# Patient Record
Sex: FEMALE | Race: WHITE | NOT HISPANIC OR LATINO | Employment: FULL TIME | URBAN - METROPOLITAN AREA
[De-identification: names, ages, dates, MRNs, and addresses within clinical notes are randomized per-mention and may not be internally consistent; named-entity substitution may affect disease eponyms.]

---

## 2024-08-01 ENCOUNTER — APPOINTMENT (OUTPATIENT)
Dept: RADIOLOGY | Facility: CLINIC | Age: 34
End: 2024-08-01
Attending: EMERGENCY MEDICINE
Payer: COMMERCIAL

## 2024-08-01 ENCOUNTER — HOSPITAL ENCOUNTER (EMERGENCY)
Facility: CLINIC | Age: 34
End: 2024-08-01

## 2024-08-01 ENCOUNTER — HOSPITAL ENCOUNTER (EMERGENCY)
Facility: CLINIC | Age: 34
Discharge: HOME | End: 2024-08-01
Attending: EMERGENCY MEDICINE | Admitting: EMERGENCY MEDICINE
Payer: COMMERCIAL

## 2024-08-01 VITALS
HEIGHT: 67 IN | SYSTOLIC BLOOD PRESSURE: 128 MMHG | HEART RATE: 107 BPM | DIASTOLIC BLOOD PRESSURE: 72 MMHG | OXYGEN SATURATION: 99 % | WEIGHT: 195 LBS | BODY MASS INDEX: 30.61 KG/M2 | TEMPERATURE: 97.8 F | RESPIRATION RATE: 18 BRPM

## 2024-08-01 DIAGNOSIS — M54.42 ACUTE MIDLINE LOW BACK PAIN WITH LEFT-SIDED SCIATICA: ICD-10-CM

## 2024-08-01 PROCEDURE — 96372 THER/PROPH/DIAG INJ SC/IM: CPT | Performed by: EMERGENCY MEDICINE

## 2024-08-01 PROCEDURE — 250N000011 HC RX IP 250 OP 636: Performed by: EMERGENCY MEDICINE

## 2024-08-01 PROCEDURE — 250N000013 HC RX MED GY IP 250 OP 250 PS 637: Performed by: EMERGENCY MEDICINE

## 2024-08-01 PROCEDURE — 72100 X-RAY EXAM L-S SPINE 2/3 VWS: CPT

## 2024-08-01 PROCEDURE — 99284 EMERGENCY DEPT VISIT MOD MDM: CPT | Mod: 25

## 2024-08-01 RX ORDER — KETOROLAC TROMETHAMINE 30 MG/ML
30 INJECTION, SOLUTION INTRAMUSCULAR; INTRAVENOUS ONCE
Status: COMPLETED | OUTPATIENT
Start: 2024-08-01 | End: 2024-08-01

## 2024-08-01 RX ORDER — METHOCARBAMOL 500 MG/1
500 TABLET, FILM COATED ORAL 3 TIMES DAILY PRN
Qty: 20 TABLET | Refills: 0 | Status: SHIPPED | OUTPATIENT
Start: 2024-08-01

## 2024-08-01 RX ORDER — METHOCARBAMOL 500 MG/1
500 TABLET, FILM COATED ORAL ONCE
Status: COMPLETED | OUTPATIENT
Start: 2024-08-01 | End: 2024-08-01

## 2024-08-01 RX ORDER — METHYLPREDNISOLONE 4 MG
TABLET, DOSE PACK ORAL
Qty: 21 TABLET | Refills: 0 | Status: SHIPPED | OUTPATIENT
Start: 2024-08-01

## 2024-08-01 RX ORDER — ACETAMINOPHEN 325 MG/1
650 TABLET ORAL ONCE
Status: COMPLETED | OUTPATIENT
Start: 2024-08-01 | End: 2024-08-01

## 2024-08-01 RX ORDER — LIDOCAINE 4 G/G
1 PATCH TOPICAL ONCE
Status: DISCONTINUED | OUTPATIENT
Start: 2024-08-01 | End: 2024-08-01 | Stop reason: HOSPADM

## 2024-08-01 RX ADMIN — LIDOCAINE 1 PATCH: 4 PATCH TOPICAL at 16:04

## 2024-08-01 RX ADMIN — KETOROLAC TROMETHAMINE 30 MG: 30 INJECTION, SOLUTION INTRAMUSCULAR at 16:05

## 2024-08-01 RX ADMIN — METHOCARBAMOL 500 MG: 500 TABLET ORAL at 16:02

## 2024-08-01 RX ADMIN — ACETAMINOPHEN 650 MG: 325 TABLET ORAL at 16:02

## 2024-08-01 ASSESSMENT — COLUMBIA-SUICIDE SEVERITY RATING SCALE - C-SSRS
2. HAVE YOU ACTUALLY HAD ANY THOUGHTS OF KILLING YOURSELF IN THE PAST MONTH?: NO
1. IN THE PAST MONTH, HAVE YOU WISHED YOU WERE DEAD OR WISHED YOU COULD GO TO SLEEP AND NOT WAKE UP?: NO
6. HAVE YOU EVER DONE ANYTHING, STARTED TO DO ANYTHING, OR PREPARED TO DO ANYTHING TO END YOUR LIFE?: NO

## 2024-08-01 ASSESSMENT — ACTIVITIES OF DAILY LIVING (ADL)
ADLS_ACUITY_SCORE: 33
ADLS_ACUITY_SCORE: 33

## 2024-08-01 NOTE — Clinical Note
Amanda Simmons was seen and treated in our emergency department on 8/1/2024.  She may return to work on 08/05/2024.       If you have any questions or concerns, please don't hesitate to call.      Kacey Boyce, DO

## 2024-08-01 NOTE — DISCHARGE INSTRUCTIONS
The x-rays of your low back appear reassuring.  Although the exact cause of your back pain remains unclear, a herniated disc seem most likely.  The other possible cause would be muscle spasms.      Continue to use over-the-counter naproxen twice a day as needed.  You can also use your home lidocaine patches as directed to help with the pain.  Take the prescribed muscle relaxant as needed.  Use the Medrol Dosepak as directed to which may help decrease the swelling and pain associated with a herniated disc.    A referral will be placed for you to follow-up with the spine clinic.  You would like to be contacted for a follow-up appointment within the next week.  Return back to ED sooner for any worsening back pain, numbness/tingling/weakness in your legs, loss of bowel or bladder control, or any other new or concerning symptoms.

## 2024-08-01 NOTE — ED PROVIDER NOTES
EMERGENCY DEPARTMENT ENCOUNTER      NAME: Amanda Simmons  AGE: 34 year old female  YOB: 1990  MRN: [id number]  EVALUATION DATE & TIME: No admission date for patient encounter.    PCP: [facility]    ED PROVIDER: Kacey Boyce DO      Chief Complaint   Patient presents with    Back Injury    Fall         FINAL IMPRESSION:  1. Acute midline low back pain with left-sided sciatica          ED COURSE & MEDICAL DECISION MAKIN-year-old female presented to the ED for evaluation of low back pain that occurred after she squatted down to pick something up.  Patient also reported paresthesias traveling down her right leg.  The patient denied any other associated symptoms or trauma or injury.  Here in the ED the patient was slightly tachycardic upon arrival.  She was uncomfortable appearing at the time of her initial evaluation.  The exam was somewhat limited since the patient could not sit or lay down secondary to her pain.  Patient preferred to  a hunched over position.  On exam she was noted tenderness palpation noted over the lumbar spine and paraspinal musculature.      Following initial evaluation the patient was given IM Toradol, Tylenol, Robaxin, and a lidocaine patch.    X-rays of the lumbar spine did not show evidence of acute fracture or dislocation.    The patient was reevaluated and informed of the reassuring x-ray results.  Patient states that her pain improved from a 10 down to a 5 or 6.  Patient was also able to sit in a chair which she was unable to do earlier.  The patient also stated that the numbness and tingling had resolved as well.  The patient was reexamined.  The patient did not have any neurologic deficits noted in her lower extremities at the time reevaluation although her bilateral straight leg raising test was a equivocal.     The patient was informed that her symptoms likely represent a lumbar disc herniation versus muscle spasm.  Because the  patient does not have any objective neurologic deficits here in the ED additional imaging with an MRI was not felt to be necessary at this time.  After educating and reassure the patient she felt comfortable returning home.  The patient was sent home with a Medrol Dosepak and a muscle relaxant to use as needed.  The patient will continue taking her home naproxen and lidocaine patches as needed.  A referral was placed for the patient to follow-up with the spine clinic.  The patient was instructed to return back to ED sooner for any worsening pain, numbness/tingling/weakness in the lower extremities, loss of bowel or bladder control, or any other new or concerning symptoms.    Pertinent Labs & Imaging studies reviewed. (See chart for details)  3:04 PM I met with the patient to gather history and to perform my initial exam. We discussed plans for the ED course, including diagnostic testing and treatment.  6:01 PM Rechecked and updated patient.      PM Reevaluated and updated the patient with results. We discussed the plan for discharge and the patient is agreeable. Reviewed supportive cares, symptomatic treatment, outpatient follow up, and reasons to return to the Emergency Department. Patient to be discharged by ED RN.      34 year old female presents to the Emergency Department for evaluation of back injury.         At the conclusion of the encounter I discussed the results of all of the tests and the disposition. The questions were answered. The patient or family acknowledged understanding and was agreeable with the care plan.     Medical Decision Making    History:  Supplemental history from: Documented in chart  External Record(s) reviewed: Documented in chart    Work Up:  Chart documentation includes differential considered and any EKGs or imaging independently interpreted by provider, where specified.  In additional to work up documented, I considered the following work up: Documented in chart, if  "applicable.    External consultation:  Discussion of management with another provider: Documented in chart, if applicable    Complicating factors:  Care impacted by chronic illness: N/A  Care affected by social determinants of health: Access to Medical Care    Disposition considerations: Discharge. I prescribed additional prescription strength medication(s) as charted. See documentation for any additional details.      PPE worn: n95 mask, goggles    MEDICATIONS GIVEN IN THE EMERGENCY:  Medications   Lidocaine (LIDOCARE) 4 % Patch 1 patch (1 patch Transdermal $Patch/Med Applied 8/1/24 1604)   ketorolac (TORADOL) injection 30 mg (30 mg Intramuscular $Given 8/1/24 1605)   acetaminophen (TYLENOL) tablet 650 mg (650 mg Oral $Given 8/1/24 1602)   methocarbamol (ROBAXIN) tablet 500 mg (500 mg Oral $Given 8/1/24 1602)       NEW PRESCRIPTIONS STARTED AT TODAY'S ER VISIT  New Prescriptions    METHOCARBAMOL (ROBAXIN) 500 MG TABLET    Take 1 tablet (500 mg) by mouth 3 times daily as needed for muscle spasms    METHYLPREDNISOLONE (MEDROL DOSEPAK) 4 MG TABLET THERAPY PACK    Follow Package Directions          =================================================================    HPI    Patient information was obtained from: patient    Use of : N/A         Amadna Simmons is a 34 year old female with no pertinent history who presents to this ED private car for evaluation of back pain.    Patient is presenting to the ED for a back pain. She was doing a squat to grab something while cooking and felt something \"pop\" in her low back. She was unable to get out of the squatting position. Pain is in her low back and radiates into right leg all the way down to her toes while walking. She describes paresthesias in her right leg. Whiles standing pain is isolated just in her low back. She states sitting down is painful.     Patient denies taking medications for pain, other injuries, and history of back pain. There were no " "other complaints/concerns at this time.      REVIEW OF SYSTEMS   Review of Systems     PAST MEDICAL HISTORY:  History reviewed. No pertinent past medical history.    PAST SURGICAL HISTORY:  History reviewed. No pertinent surgical history.        CURRENT MEDICATIONS:    methocarbamol (ROBAXIN) 500 MG tablet  methylPREDNISolone (MEDROL DOSEPAK) 4 MG tablet therapy pack        ALLERGIES:  Allergies   Allergen Reactions    Other (Do Not Use) Other (See Comments)     Cat Epithelial= cause's throat tightness    Penicillins Hives     sob    Shellfish-Derived Products Hives       FAMILY HISTORY:  History reviewed. No pertinent family history.    SOCIAL HISTORY:   Social History     Socioeconomic History    Marital status:      Spouse name: None    Number of children: None    Years of education: None    Highest education level: None       VITALS:  /72   Pulse 107   Temp 97.8  F (36.6  C) (Temporal)   Resp 18   Ht 1.71 m (5' 7.32\")   Wt 88.5 kg (195 lb)   SpO2 99%   BMI 30.25 kg/m      PHYSICAL EXAM    General presentation: Alert, Vital signs reviewed.  Uncomfortable appearing.  Standing in a hunched position.   HENT: ENT inspection is normal. Oropharynx is moist and clear.   Eye: Pupils are equal and reactive to light. EOMI  Neck: The neck is supple.  Pulmonary: Currently in no acute respiratory distress.   Circulatory: Peripheral pulses are strong and equal in the bilateral upper lower extremities.   Neurologic: Alert, oriented to person, place, and time.  Strength is 5/5 in the bilateral lower extremities.  No sensory deficits noted to light touch in the bilateral lower extremities.  Patellar DTR are 2/4 bilaterally.  Bilateral straight leg raising test is equivocal.  Cranial nerves II through XII are grossly intact.  Musculoskeletal: Mild tenderness palpation noted over the lumbar spine.  No extremity tenderness. Full range of motion in all extremities. No extremity edema.   Skin: Skin color is normal. " No rash. Warm. Dry to touch.       LAB:  All pertinent labs reviewed and interpreted.  Results for orders placed or performed during the hospital encounter of 08/01/24   Lumbar spine XR, 2-3 views    Impression    IMPRESSION: Normal vertebral body heights. Negative for fracture. Mild upper lumbar dextroconvex curvature centered at L2. Normal disc spaces. No sagittal subluxation.       RADIOLOGY:  Reviewed all pertinent imaging. Please see official radiology report.  Lumbar spine XR, 2-3 views   Final Result   IMPRESSION: Normal vertebral body heights. Negative for fracture. Mild upper lumbar dextroconvex curvature centered at L2. Normal disc spaces. No sagittal subluxation.             I, Fabio Ma, am serving as a scribe to document services personally performed by Kacey Boyce DO based on my observation and the provider's statements to me. I, Kacey Boyce, attest that Fabio Ma is acting in a scribe capacity, has observed my performance of the services and has documented them in accordance with my direction.    Kacey Boyce DO  Emergency Medicine  [facility] EMERGENCY ROOM  [address] [id number] [phone]       Kacey Boyce DO  08/01/24 1842       Kacey Boyce DO  08/01/24 1844

## 2024-08-01 NOTE — ED TRIAGE NOTES
Pt presents to the ED after falling in the kitchen an hour ago injuring lower back. Endorses pain radiating and causing tingling in bilateral legs but worse in right. Denies numbness, loss of bladder bowel. Endorses history of spine issues and had cervical injections 2 weeks ago     Triage Assessment (Adult)       Row Name 08/01/24 1329          Triage Assessment    Airway WDL WDL        Respiratory WDL    Respiratory WDL WDL        Skin Circulation/Temperature WDL    Skin Circulation/Temperature WDL WDL        Cardiac WDL    Cardiac WDL WDL        Peripheral/Neurovascular WDL    Peripheral Neurovascular WDL WDL        Cognitive/Neuro/Behavioral WDL    Cognitive/Neuro/Behavioral WDL WDL

## 2025-02-19 ENCOUNTER — APPOINTMENT (OUTPATIENT)
Dept: CT IMAGING | Facility: HOSPITAL | Age: 35
End: 2025-02-19
Payer: COMMERCIAL

## 2025-02-19 ENCOUNTER — HOSPITAL ENCOUNTER (EMERGENCY)
Facility: HOSPITAL | Age: 35
Discharge: HOME | End: 2025-02-20
Payer: COMMERCIAL

## 2025-02-19 VITALS
SYSTOLIC BLOOD PRESSURE: 123 MMHG | HEIGHT: 67 IN | OXYGEN SATURATION: 96 % | HEART RATE: 85 BPM | TEMPERATURE: 98.6 F | DIASTOLIC BLOOD PRESSURE: 77 MMHG | WEIGHT: 210 LBS | RESPIRATION RATE: 28 BRPM | BODY MASS INDEX: 32.96 KG/M2

## 2025-02-19 DIAGNOSIS — R55 SYNCOPE AND COLLAPSE: ICD-10-CM

## 2025-02-19 DIAGNOSIS — S06.0X1A CONCUSSION WITH LOSS OF CONSCIOUSNESS OF 30 MINUTES OR LESS, INITIAL ENCOUNTER: ICD-10-CM

## 2025-02-19 LAB
ALBUMIN SERPL BCG-MCNC: 4 G/DL (ref 3.5–5.2)
ALP SERPL-CCNC: 63 U/L (ref 40–150)
ALT SERPL W P-5'-P-CCNC: 30 U/L (ref 0–50)
ANION GAP SERPL CALCULATED.3IONS-SCNC: 11 MMOL/L (ref 7–15)
AST SERPL W P-5'-P-CCNC: 17 U/L (ref 0–45)
BILIRUB DIRECT SERPL-MCNC: 0.11 MG/DL (ref 0–0.3)
BILIRUB SERPL-MCNC: 0.3 MG/DL
BUN SERPL-MCNC: 13.9 MG/DL (ref 6–20)
CALCIUM SERPL-MCNC: 9.4 MG/DL (ref 8.8–10.4)
CHLORIDE SERPL-SCNC: 104 MMOL/L (ref 98–107)
CREAT SERPL-MCNC: 0.63 MG/DL (ref 0.51–0.95)
D DIMER PPP FEU-MCNC: 1.19 UG/ML FEU (ref 0–0.5)
EGFRCR SERPLBLD CKD-EPI 2021: >90 ML/MIN/1.73M2
ERYTHROCYTE [DISTWIDTH] IN BLOOD BY AUTOMATED COUNT: 13.2 % (ref 10–15)
GLUCOSE SERPL-MCNC: 116 MG/DL (ref 70–99)
HCG UR QL: NEGATIVE
HCO3 SERPL-SCNC: 24 MMOL/L (ref 22–29)
HCT VFR BLD AUTO: 36.8 % (ref 35–47)
HGB BLD-MCNC: 11.9 G/DL (ref 11.7–15.7)
MAGNESIUM SERPL-MCNC: 2.2 MG/DL (ref 1.7–2.3)
MCH RBC QN AUTO: 27.5 PG (ref 26.5–33)
MCHC RBC AUTO-ENTMCNC: 32.3 G/DL (ref 31.5–36.5)
MCV RBC AUTO: 85 FL (ref 78–100)
PLATELET # BLD AUTO: 236 10E3/UL (ref 150–450)
POTASSIUM SERPL-SCNC: 3.8 MMOL/L (ref 3.4–5.3)
PROT SERPL-MCNC: 6.6 G/DL (ref 6.4–8.3)
RBC # BLD AUTO: 4.32 10E6/UL (ref 3.8–5.2)
SODIUM SERPL-SCNC: 139 MMOL/L (ref 135–145)
TROPONIN T SERPL HS-MCNC: <6 NG/L
WBC # BLD AUTO: 10 10E3/UL (ref 4–11)

## 2025-02-19 PROCEDURE — 85018 HEMOGLOBIN: CPT

## 2025-02-19 PROCEDURE — 70450 CT HEAD/BRAIN W/O DYE: CPT

## 2025-02-19 PROCEDURE — 84484 ASSAY OF TROPONIN QUANT: CPT

## 2025-02-19 PROCEDURE — 96374 THER/PROPH/DIAG INJ IV PUSH: CPT | Mod: 59

## 2025-02-19 PROCEDURE — 84075 ASSAY ALKALINE PHOSPHATASE: CPT

## 2025-02-19 PROCEDURE — 85048 AUTOMATED LEUKOCYTE COUNT: CPT

## 2025-02-19 PROCEDURE — 82565 ASSAY OF CREATININE: CPT

## 2025-02-19 PROCEDURE — 250N000013 HC RX MED GY IP 250 OP 250 PS 637

## 2025-02-19 PROCEDURE — 82247 BILIRUBIN TOTAL: CPT

## 2025-02-19 PROCEDURE — 250N000011 HC RX IP 250 OP 636

## 2025-02-19 PROCEDURE — 85379 FIBRIN DEGRADATION QUANT: CPT

## 2025-02-19 PROCEDURE — 81025 URINE PREGNANCY TEST: CPT

## 2025-02-19 PROCEDURE — 96375 TX/PRO/DX INJ NEW DRUG ADDON: CPT

## 2025-02-19 PROCEDURE — 80048 BASIC METABOLIC PNL TOTAL CA: CPT

## 2025-02-19 PROCEDURE — 83735 ASSAY OF MAGNESIUM: CPT

## 2025-02-19 PROCEDURE — 99285 EMERGENCY DEPT VISIT HI MDM: CPT | Mod: 25

## 2025-02-19 PROCEDURE — 82248 BILIRUBIN DIRECT: CPT

## 2025-02-19 PROCEDURE — 36415 COLL VENOUS BLD VENIPUNCTURE: CPT

## 2025-02-19 PROCEDURE — 93005 ELECTROCARDIOGRAM TRACING: CPT

## 2025-02-19 RX ORDER — DIPHENHYDRAMINE HYDROCHLORIDE 50 MG/ML
12.5 INJECTION INTRAMUSCULAR; INTRAVENOUS ONCE
Status: COMPLETED | OUTPATIENT
Start: 2025-02-19 | End: 2025-02-19

## 2025-02-19 RX ORDER — ACETAMINOPHEN 325 MG/1
975 TABLET ORAL ONCE
Status: DISCONTINUED | OUTPATIENT
Start: 2025-02-19 | End: 2025-02-19

## 2025-02-19 RX ORDER — METOCLOPRAMIDE HYDROCHLORIDE 5 MG/ML
10 INJECTION INTRAMUSCULAR; INTRAVENOUS ONCE
Status: COMPLETED | OUTPATIENT
Start: 2025-02-19 | End: 2025-02-19

## 2025-02-19 RX ORDER — KETOROLAC TROMETHAMINE 15 MG/ML
15 INJECTION, SOLUTION INTRAMUSCULAR; INTRAVENOUS ONCE
Status: COMPLETED | OUTPATIENT
Start: 2025-02-19 | End: 2025-02-19

## 2025-02-19 RX ORDER — LIDOCAINE 40 MG/G
CREAM TOPICAL
Status: DISCONTINUED | OUTPATIENT
Start: 2025-02-19 | End: 2025-02-20 | Stop reason: HOSPADM

## 2025-02-19 RX ORDER — ACETAMINOPHEN 500 MG
1000 TABLET ORAL ONCE
Status: COMPLETED | OUTPATIENT
Start: 2025-02-19 | End: 2025-02-19

## 2025-02-19 RX ADMIN — ACETAMINOPHEN 1000 MG: 500 TABLET, FILM COATED ORAL at 23:08

## 2025-02-19 RX ADMIN — DIPHENHYDRAMINE HYDROCHLORIDE 12.5 MG: 50 INJECTION INTRAMUSCULAR; INTRAVENOUS at 23:31

## 2025-02-19 RX ADMIN — KETOROLAC TROMETHAMINE 15 MG: 15 INJECTION, SOLUTION INTRAMUSCULAR; INTRAVENOUS at 23:31

## 2025-02-19 RX ADMIN — METOCLOPRAMIDE 10 MG: 5 INJECTION, SOLUTION INTRAMUSCULAR; INTRAVENOUS at 23:31

## 2025-02-19 ASSESSMENT — ACTIVITIES OF DAILY LIVING (ADL)
ADLS_ACUITY_SCORE: 41

## 2025-02-19 ASSESSMENT — COLUMBIA-SUICIDE SEVERITY RATING SCALE - C-SSRS
6. HAVE YOU EVER DONE ANYTHING, STARTED TO DO ANYTHING, OR PREPARED TO DO ANYTHING TO END YOUR LIFE?: NO
1. IN THE PAST MONTH, HAVE YOU WISHED YOU WERE DEAD OR WISHED YOU COULD GO TO SLEEP AND NOT WAKE UP?: NO
2. HAVE YOU ACTUALLY HAD ANY THOUGHTS OF KILLING YOURSELF IN THE PAST MONTH?: NO

## 2025-02-19 NOTE — Clinical Note
Amanda Simmons was seen and treated in our emergency department on 2/19/2025.  She may return to work on 02/23/2025.       If you have any questions or concerns, please don't hesitate to call.      Chetna Nix PA-C

## 2025-02-20 ENCOUNTER — APPOINTMENT (OUTPATIENT)
Dept: CT IMAGING | Facility: HOSPITAL | Age: 35
End: 2025-02-20
Payer: COMMERCIAL

## 2025-02-20 LAB
ATRIAL RATE - MUSE: 84 BPM
DIASTOLIC BLOOD PRESSURE - MUSE: NORMAL MMHG
INTERPRETATION ECG - MUSE: NORMAL
P AXIS - MUSE: 62 DEGREES
PR INTERVAL - MUSE: 144 MS
QRS DURATION - MUSE: 106 MS
QT - MUSE: 388 MS
QTC - MUSE: 458 MS
R AXIS - MUSE: 64 DEGREES
SYSTOLIC BLOOD PRESSURE - MUSE: NORMAL MMHG
T AXIS - MUSE: 32 DEGREES
VENTRICULAR RATE- MUSE: 84 BPM

## 2025-02-20 PROCEDURE — 71275 CT ANGIOGRAPHY CHEST: CPT

## 2025-02-20 PROCEDURE — 250N000011 HC RX IP 250 OP 636

## 2025-02-20 RX ORDER — ONDANSETRON 4 MG/1
4 TABLET, ORALLY DISINTEGRATING ORAL EVERY 6 HOURS PRN
Qty: 10 TABLET | Refills: 0 | Status: SHIPPED | OUTPATIENT
Start: 2025-02-20

## 2025-02-20 RX ORDER — IOPAMIDOL 755 MG/ML
90 INJECTION, SOLUTION INTRAVASCULAR ONCE
Status: COMPLETED | OUTPATIENT
Start: 2025-02-20 | End: 2025-02-20

## 2025-02-20 RX ADMIN — IOPAMIDOL 90 ML: 755 INJECTION, SOLUTION INTRAVENOUS at 00:20

## 2025-02-20 NOTE — ED PROVIDER NOTES
"Emergency Department Encounter  [facility] EMERGENCY DEPARTMENT  Amanda Simmons   AGE: 34 year old SEX: female  YOB: 1990  MRN: [id number]      EVALUATION DATE & TIME: 2/19/2025  8:38 PM ; PCP: [facility]   ED PROVIDER: Chetna Nix PA-C    CHIEF COMPLAINT: Fall (Nystagmus and worsening Sx)      FINAL IMPRESSION       ICD-10-CM    1. Concussion with loss of consciousness of 30 minutes or less, initial encounter  S06.0X1A Concussion  Referral      2. Syncope and collapse  R55           MEDICAL DECISION MAKING   34 year old female with a pertinent history of cervical radiculopathy, factor V Leiden, and Hashimoto's disease who presents to the ED for evaluation of syncopal episode on 2/17 while at work.  Patient does not remember the events immediately preceding or following her syncopal episodes but her coworker said that she was standing typing at the computer and fell to the floor hitting her head on the concrete.  She did not regain consciousness for about a minute and her eyes were noted to be \"twitching\".  Otherwise felt well that day without any preceding or following chest pain, shortness of breath, abdominal pain.  Since regaining consciousness, she has reported a headache with nausea and light sensitivity. History of factor V Leiden. History of seizures following a traumatic brain injury, last seizure episode in 2006. No family history of sudden death or underlying cardiac disease. Was seen by physical therapy today and noted to have \"nystagmus with eye motion tracking\" and advised her to come to the emergency department.    ED Course as of 02/20/25 0036 Wed Feb 19, 2025 2104 I met and introduced myself to the patient. I gathered initial history and performed my physical exam.  Vitals reviewed and hemodynamically stable, afebrile but tachycardic at 111 bpm.  On exam, patient is uncomfortable and sensitive to light. A&Ox4 " without focal neurological deficit.  No signs of basilar skull fracture head trauma, PERRLA without any nystagmus noted.  No midline spinal tenderness.  Plan for syncope workup and CT imaging of the head.  Patient with history of factor V Leiden and tachycardic's will also add D-dimer to assess for PE.   2212 HCG Qual Urine: Negative   2239 Troponin T, High Sensitivity: <6   2240 Basic metabolic panel(!)  No hypo/hyperglycemia, metabolic acidosis, and emergent electrolyte derangement. Kidney function maintained.   2240 CBC with platelets  No leukocytosis or significant anemia, hemoglobin stable.   2303 Head CT w/o contrast  No acute intracranial hemorrhage or calvarial fracture.   2349 D-Dimer Quantitative(!): 1.19  Will proceed with CT PE.   Thu Feb 20, 2025   0024 Updated patient on plan.  Pain significantly improved and now 2/10.   0035 CT Chest Pulmonary Embolism w Contrast  Negative for PE.   0035 I rechecked the patient and discussed results, discharge, and follow up. Questions were answered.      Medications given today in the emergency department:  Medications   lidocaine 1 % 0.1-1 mL (has no administration in time range)   lidocaine (LMX4) cream (has no administration in time range)   sodium chloride (PF) 0.9% PF flush 3 mL (has no administration in time range)   sodium chloride (PF) 0.9% PF flush 3 mL (3 mLs Intracatheter $Given 2/19/25 2211)   acetaminophen (TYLENOL) tablet 1,000 mg (1,000 mg Oral $Given 2/19/25 2308)   ketorolac (TORADOL) injection 15 mg (15 mg Intravenous $Given 2/19/25 2331)   metoclopramide (REGLAN) injection 10 mg (10 mg Intravenous $Given 2/19/25 2331)   diphenhydrAMINE (BENADRYL) injection 12.5 mg (12.5 mg Intravenous $Given 2/19/25 2331)   iopamidol (ISOVUE-370) solution 90 mL (90 mLs Intravenous $Given 2/20/25 0020)     Assessment/Plan: Headache pain likely due to concussion secondary to head injury following syncope and collapse.  Unsure the exact cause of patient's syncope at  this time but based on evaluation today, symptoms do not appear to be due to a cause requiring emergent intervention. Based on Hudspeth Syncope Risk Score (CSRS), patient is low risk for adverse events following syncope. Patient is without abnormal ECG, history of structural heart disease or clinical findings suggestive of heart failure, family history of sudden cardiac death, or concerning comorbidities that would indicate admission to the hospital. Plan to discharge home with prompt primary care follow-up and neurology referral. Rx for zofran.   Acutely serious/life threatening considerations: ACS, seizure (no postictal period, tongue laceration, incontinence), pulmonary embolism, hypoglycemia, intracranial hemorrhage, dysrhythmia, hypertrophic cardiomyopathy, sepsis, GI bleed (no melena, hemoglobin stable), thoracic aortic dissection (no sudden severe chest or upper back pain), abdominal aortic dissection (no sudden, severe abdominal pain or pulsating mass, ruptured ectopic pregnancy    New prescriptions started at today's ED visit:   New Prescriptions    ONDANSETRON (ZOFRAN ODT) 4 MG ODT TAB    Take 1 tablet (4 mg) by mouth every 6 hours as needed for nausea or vomiting.     Medical Decision Making  Obtained supplemental history:Supplemental history obtained?: No  Reviewed external records: External records reviewed?: Documented in chart  Care impacted by chronic illness:Other: Factor V Leiden  Care significantly affected by social determinants of health:N/A  Did you consider but not order tests?: Work up considered but not performed and documented in chart, if applicable  Did you interpret images independently?: Independent interpretation of ECG and images noted in documentation, when applicable.  Consultation discussion with other provider:Did you involve another provider (consultant, , pharmacy, etc.)?: No  Admission considered. Patient was signed out to the oncoming physician, disposition pending.    Not  "Applicable     =================================================================      HISTORY OF PRESENT ILLNESS   Patient information was obtained from: patient   Use of Intrepreter: N/A     Amanda Simmons is a 34 year old female with a pertinent history of cervical radiculopathy, factor V Leiden, and Hashimoto's disease who presents to the ED by private vehicle for evaluation of fall.  Patient reports that she was standing at a keyboard at work when she had a syncopal episode.  She does not remember the events preceding her loss of consciousness or immediately following waking up but states that her coworker notes she fell to the ground and her eyes were twitching.  States she felt well that day without any chest pain, shortness of breath, or abdominal pain.  After regaining consciousness, she reports increased headache, nausea, and light sensitivity.  There was no vomiting following fall.  She does endorse a history of factor V Leiden but does not have any previous blood clots does not have chest pain but has been feeling short of breath. No hemoptysis, recent surgery or trauma, hx of blood clot, or exogenous estrogenic hormone use.  Does endorse a history of seizures following a traumatic brain injury.  Last seizure in 2006 and no longer taking seizure medications.  There was no loss of bowel or bladder control or tongue injury following a syncopal episode.  No family history of sudden death or underlying cardiac disease.    She was seen by physical therapy (Nirav Villegas, PT at [facility]) today for chronic neck and back pain. PT noted \"nystagmus with eye motion tracking that reproduced headache with decreased sensation to light touch over right side of face\" and advised her to come to the emergency department.    MEDICAL HISTORY   No past medical history on file.    No past surgical history on file.    No family history on file.         Most Recent Immunizations   Administered Date(s) " "Administered    COVID-19 12+ (Pfizer) 12/20/2024    COVID-19 Monovalent 12+ (Pfizer 2022) 05/02/2022        ondansetron (ZOFRAN ODT) 4 MG ODT tab  methocarbamol (ROBAXIN) 500 MG tablet  methylPREDNISolone (MEDROL DOSEPAK) 4 MG tablet therapy pack        PHYSICAL EXAM   VITALS:  Patient Vitals for the past 24 hrs:   BP Temp Temp src Pulse Resp SpO2 Height Weight   02/19/25 2301 123/77 -- -- 85 28 96 % -- --   02/19/25 2028 (!) 157/96 98.6  F (37  C) Oral 111 18 99 % 1.71 m (5' 7.32\") 95.3 kg (210 lb)     Body mass index is 32.58 kg/m .     Vitals reviewed. Nursing notes reviewed.  CONSITUTIONAL: Uncomfortable appearing female in no acute distress. Well developed and nourished.   EYES: PERRLA, EOM intact. No subconjunctival hemorrhage or periorbital ecchymosis.   HENT: Normocephalic, atraumatic. No palpable hematoma or depressed skull fracture. No nasal deformity. Mandible non tender, no evidence of dental trauma. TMs normal bilaterally without hemotympanum. No bruising over the mastoid processes.   NECK: Supple, gross ROM intact.   RESPIRATORY: Unlabored respiratory effort, speaks in full sentences.  Lungs clear to auscultation bilaterally without rhonchi, wheezes, rales.   CARDIO: Normal heart rate, regular rhythm, no murmurs. No pedal edema.   GI: Soft, nondistended.  MSK: Moving all 4 extremities intentionally and without pain. No midline cervical, thoracic, or lumbar spinal tenderness or palpable stepoffs.  Moving all 4 extremities intentionally and without pain. No joint swelling, redness, or obvious deformity.  NEURO: A&Ox4. No apparent expressive or receptive aphasia. Sensation intact and symmetrical throughout V1, V2, V3. Hearing grossly intact, symmetrical bilaterally. Finger-to-nose normal bilaterally. Romberg and pronator drift within normal limits. Gait intact, able to walk on heels, walk on toes.    PSYCH: Thoughts linear and responses appropriate. Cooperative. Appropriate mood and affect.     LABS & " IMAGING   All pertinent labs and imaging reviewed and interpreted.  Results for orders placed or performed during the hospital encounter of 02/19/25   Head CT w/o contrast    Impression    IMPRESSION:  1.  No acute intracranial hemorrhage or calvarial fracture.   CT Chest Pulmonary Embolism w Contrast    Impression    IMPRESSION:  1.  No pulmonary emboli. No etiology for symptoms evident.     HCG qualitative urine   Result Value Ref Range    hCG Urine Qualitative Negative Negative   Basic metabolic panel   Result Value Ref Range    Sodium 139 135 - 145 mmol/L    Potassium 3.8 3.4 - 5.3 mmol/L    Chloride 104 98 - 107 mmol/L    Carbon Dioxide (CO2) 24 22 - 29 mmol/L    Anion Gap 11 7 - 15 mmol/L    Urea Nitrogen 13.9 6.0 - 20.0 mg/dL    Creatinine 0.63 0.51 - 0.95 mg/dL    GFR Estimate >90 >60 mL/min/1.73m2    Calcium 9.4 8.8 - 10.4 mg/dL    Glucose 116 (H) 70 - 99 mg/dL   Hepatic panel   Result Value Ref Range    Protein Total 6.6 6.4 - 8.3 g/dL    Albumin 4.0 3.5 - 5.2 g/dL    Bilirubin Total 0.3 <=1.2 mg/dL    Alkaline Phosphatase 63 40 - 150 U/L    AST 17 0 - 45 U/L    ALT 30 0 - 50 U/L    Bilirubin Direct 0.11 0.00 - 0.30 mg/dL   Result Value Ref Range    Magnesium 2.2 1.7 - 2.3 mg/dL   CBC with platelets   Result Value Ref Range    WBC Count 10.0 4.0 - 11.0 10e3/uL    RBC Count 4.32 3.80 - 5.20 10e6/uL    Hemoglobin 11.9 11.7 - 15.7 g/dL    Hematocrit 36.8 35.0 - 47.0 %    MCV 85 78 - 100 fL    MCH 27.5 26.5 - 33.0 pg    MCHC 32.3 31.5 - 36.5 g/dL    RDW 13.2 10.0 - 15.0 %    Platelet Count 236 150 - 450 10e3/uL   Result Value Ref Range    Troponin T, High Sensitivity <6 <=14 ng/L   D dimer quantitative   Result Value Ref Range    D-Dimer Quantitative 1.19 (H) 0.00 - 0.50 ug/mL FEU       ECG:  Performed at: [facility] EMERGENCY DEPARTMENT  Impression: Normal sinus rhythm at 84 bpm without ST elevation, depression, or signs of ischemia.  Comparisons: No previous ECGs available for  comparison    EKG was collected and independently interpreted by myself and also confirmed by Dr. Nice, ED MD.    Chetna Nix PA-C   Emergency Medicine   [facility] EMERGENCY DEPARTMENT  [address] [place] [id number] [phone]  Dept: [phone]     Chetna Nix PA-C  02/20/25 0036

## 2025-02-20 NOTE — DISCHARGE INSTRUCTIONS
The scan of your head showed no evidence of brain bleed.  I do suspect that you suffered a concussion from your fall.  Please see the attached document on healing after concussion for steps to manage your symptoms. For pain - acetaminophen (1000 mg every 6 hours as needed).    There are multiple aspects of your syncope story that are concerning to me.  Fortunately, your emergency department evaluation is reassuring today.     I would like you to follow-up with your primary doctor for reevaluation within 3 to 5 days.    I have placed a neurology referral given your history of seizures and now a syncopal episode.  They will be calling you to schedule an appointment.    I have also placed a referral for you to be seen by the concussion clinic.  They will be calling you to schedule an appointment.     Call 911 anytime you think you may need emergency care. For example, call if:    You have symptoms of a heart problem. These may include:  Chest pain or pressure.  Severe trouble breathing.  A fast or irregular heartbeat.  Lightheadedness or sudden weakness.  Coughing up pink, foamy mucus.  Passing out.  After you call 911, the  may tell you to chew 1 adult-strength or 2 to 4 low-dose aspirin. Wait for an ambulance. Do not try to drive yourself.     You have symptoms of a stroke. These may include:  Sudden numbness, tingling, weakness, or loss of movement in your face, arm, or leg, especially on only one side of your body.  Sudden vision changes.  Sudden trouble speaking.  Sudden confusion or trouble understanding simple statements.  Sudden problems with walking or balance.  A sudden, severe headache that is different from past headaches.     You passed out (lost consciousness) again.   Watch closely for changes in your health, and be sure to contact your doctor if:    You do not get better as expected.

## 2025-02-20 NOTE — ED TRIAGE NOTES
Pt here d/t fall on Monday at work. Per coworker, Pt fell and hit head and had nystagmus upon waking. Pt does not remember event. Pt has been having increased headache 9/10. Decreased sensation to left side of face, sensitivity to light, blurry vision, nausea, dizziness, nystagmus. No nystagmus appreciated by writer, but Pt having difficulty looking up with eyes.      Triage Assessment (Adult)       Row Name 02/19/25 2031          Triage Assessment    Airway WDL WDL        Respiratory WDL    Respiratory WDL WDL        Peripheral/Neurovascular WDL    Peripheral Neurovascular WDL WDL        Cognitive/Neuro/Behavioral WDL    Cognitive/Neuro/Behavioral WDL WDL

## 2025-06-12 ENCOUNTER — APPOINTMENT (OUTPATIENT)
Dept: RADIOLOGY | Facility: CLINIC | Age: 35
End: 2025-06-12
Attending: STUDENT IN AN ORGANIZED HEALTH CARE EDUCATION/TRAINING PROGRAM
Payer: COMMERCIAL

## 2025-06-12 PROCEDURE — 99284 EMERGENCY DEPT VISIT MOD MDM: CPT

## 2025-06-12 PROCEDURE — 73110 X-RAY EXAM OF WRIST: CPT | Mod: RT

## 2025-06-12 PROCEDURE — 73090 X-RAY EXAM OF FOREARM: CPT | Mod: RT

## 2025-06-12 PROCEDURE — 73080 X-RAY EXAM OF ELBOW: CPT | Mod: RT

## 2025-06-12 PROCEDURE — 73030 X-RAY EXAM OF SHOULDER: CPT | Mod: RT

## 2025-06-12 PROCEDURE — 250N000013 HC RX MED GY IP 250 OP 250 PS 637: Performed by: STUDENT IN AN ORGANIZED HEALTH CARE EDUCATION/TRAINING PROGRAM

## 2025-06-12 PROCEDURE — 71101 X-RAY EXAM UNILAT RIBS/CHEST: CPT | Mod: RT

## 2025-06-12 RX ORDER — NAPROXEN 250 MG/1
500 TABLET ORAL ONCE
Status: COMPLETED | OUTPATIENT
Start: 2025-06-12 | End: 2025-06-12

## 2025-06-12 RX ADMIN — NAPROXEN 500 MG: 250 TABLET ORAL at 22:21

## 2025-06-13 ENCOUNTER — HOSPITAL ENCOUNTER (EMERGENCY)
Facility: CLINIC | Age: 35
Discharge: HOME | End: 2025-06-13
Admitting: STUDENT IN AN ORGANIZED HEALTH CARE EDUCATION/TRAINING PROGRAM
Payer: OTHER MISCELLANEOUS

## 2025-06-13 VITALS
WEIGHT: 205 LBS | HEART RATE: 68 BPM | OXYGEN SATURATION: 98 % | HEIGHT: 67 IN | TEMPERATURE: 98 F | DIASTOLIC BLOOD PRESSURE: 88 MMHG | RESPIRATION RATE: 16 BRPM | SYSTOLIC BLOOD PRESSURE: 132 MMHG | BODY MASS INDEX: 32.18 KG/M2

## 2025-06-13 DIAGNOSIS — M79.601 RIGHT ARM PAIN: ICD-10-CM

## 2025-06-13 NOTE — ED TRIAGE NOTES
PT slipped on a wet floor at work after seeing a spider. She reports right wrist, right elbow, and right rib pain.       Denies hitting her head and denies LOC.        Triage Assessment (Adult)       Row Name 06/12/25 2038          Triage Assessment    Airway WDL WDL        Respiratory WDL    Respiratory WDL WDL        Cardiac WDL    Cardiac WDL WDL        Peripheral/Neurovascular WDL    Peripheral Neurovascular WDL WDL        Cognitive/Neuro/Behavioral WDL    Cognitive/Neuro/Behavioral WDL WDL

## 2025-06-13 NOTE — DISCHARGE INSTRUCTIONS
You were evaluated in the emergency department today for right arm pain after a fall.  Your x-ray does not show any evidence of fracture or malalignment today.  I suspect you likely have some bruising and will be a bit sore the next few days before you start to feel better.    Ice and elevate the arm to help with pain and swelling.    You may take Ibuprofen up to 400 mg by mouth every 4-6 hours as needed for pain. Do not exceed 2400 mg/day.  You may take Tylenol 325-1000 mg by mouth every 4-6 hours as needed for pain. Do not exceed 1000mg (1g) in 4 hours or 4 g/day from all sources.  You may combine Tylenol and Ibuprofen- up to 400 mg of ibuprofen and 1000 mg of Tylenol at the same time, up to 3 times a day for the pain    If your pain is not improving as expected I recommend following up with your primary care provider or an orthopedic clinic for recheck next week.

## 2025-06-13 NOTE — ED PROVIDER NOTES
Emergency Department Encounter   NAME: Amanda Simmnos ; AGE: 35 year old female ; YOB: 1990 ; MRN: [id number] ; PCP: Anuj Polo [place]   ED PROVIDER: Katia Khan PA-C    Evaluation Date & Time:   No admission date for patient encounter.    CHIEF COMPLAINT:  Wrist Pain, Rib Pain, and Elbow Injury        Impression and Plan   FINAL IMPRESSION:    ICD-10-CM    1. Right arm pain  M79.601           ED Course and Medical Decision Making  Amanda Simmons is a 35 year old female with a pertinent history of factor V, hypothyroidism, and panic attacks who presents to the ED for evaluation of right wrist, elbow, shoulder, and rib pain.  Patient states around 5 PM tonight she slipped on a wet floor at work after she saw a spider.  She denies hitting her head or any loss of consciousness.  No numbness or tingling of the right upper extremity.    Vitals reviewed and unremarkable. On exam she is holding her right arm and has an ice pack applied to her right wrist. Differential diagnosis/emergent conditions considered and evaluated for includes but not limited to contusion, sprain, muscle spasm, fracture, dislocation.  On exam she has tenderness to the right wrist, forearm, and elbow. No obvious deformities or ecchymosis.  She also has tenderness over the right shoulder and scapula as well as right side ribs.  Again no overlying swelling or skin changes.  She is neurovascularly intact in the right upper extremity, radial pulse 2+ bilaterally and capillary refill <2 seconds in all digits.  No sensation in the right upper extremity when compared to the left.  Will plan for x-ray of the right upper extremity and ribs to evaluate for fracture.  Patient takes naproxen twice daily for chronic pain, states she was due for dose around 8 PM and did not take it due to coming to the emergency department.  Will plan to give dose of 500 mg naproxen for pain.      XR right wrist, forearm, elbow,  shoulder, and right side ribs are all unremarkable and do not show any evidence of fracture.  I suspect patient most likely suffered contusion from falling.  Recommended ice and elevation of the right arm to help with pain and any swelling.  She can use Tylenol and ibuprofen as needed for pain management.  I recommended patient follow-up with her primary care provider orthopedic clinic if she is not improving as expected.        Medical Decision Making  Discharge. No recommendations on prescription strength medication(s). See documentation for any additional details.    MIPS (CTPE, Dental pain, Zamarripa, Sinusitis, Asthma/COPD, Head Trauma): Not Applicable    SEPSIS: None      ED COURSE:  2252 I met and introduced myself to the patient. I gathered initial history and performed my physical exam. We discussed plan for initial workup.   12:05 AM I rechecked the patient and discussed results, discharge, follow up, and reasons to return to the ED.     At the conclusion of the encounter I discussed the results of all the tests and the disposition. The questions were answered. The patient or family acknowledged understanding and was agreeable with the care plan.          MEDICATIONS GIVEN IN THE EMERGENCY DEPARTMENT:  Medications   naproxen (NAPROSYN) tablet 500 mg (500 mg Oral $Given 6/12/25 2221)         NEW PRESCRIPTIONS STARTED AT TODAY'S ED VISIT:  New Prescriptions    No medications on file         HPI     Amanda Simmons is a 35 year old female with a pertinent history of factor V, hypothyroidism, and panic attacks who presents to the ED for evaluation of right wrist, elbow, shoulder, and rib pain.  Patient states around 5 PM tonight she slipped on a wet floor at work after she saw a spider.  She denies hitting her head or any loss of consciousness.       Physical Exam     First Vitals:  Patient Vitals for the past 24 hrs:   BP Temp Temp src Pulse Resp SpO2 Height Weight   06/12/25 2036 (!) 143/91 98  F (36.7  " C) Oral 75 18 98 % 1.71 m (5' 7.32\") 93 kg (205 lb)         PHYSICAL EXAM    General Appearance:  Alert, cooperative, no distress, appears stated age  Respiratory: No distress. Lungs clear to ausculation bilaterally. No wheezes, rhonchi or stridor  Cardiovascular: Regular rate and rhythm, no murmur. Normal cap refill. No peripheral edema  GI: Abdomen soft, nontender  Musculoskeletal: Moving all extremities. No gross deformities. Tenderness to the right wrist, forearm, and elbow. No obvious deformities or ecchymosis.  She also has tenderness over the right shoulder and scapula as well as right side ribs.  Again no overlying swelling or skin changes.  She is neurovascularly intact in the right upper extremity, radial pulse 2+ bilaterally and capillary refill <2 seconds in all digits.    Integument: Warm, dry, no rashes or lesions  Neurologic: Alert and orientated x3. No focal deficits.  Psych: Normal mood and affect        Results     LAB:  All pertinent labs reviewed and interpreted  Labs Ordered and Resulted from Time of ED Arrival to Time of ED Departure - No data to display    RADIOLOGY:  Radius/Ulna XR, PA & LAT, right   Final Result   IMPRESSION: No visible fracture or dislocation.      Elbow XR, G/E 3 views, right   Final Result   IMPRESSION: Normal joint spaces and alignment. No acute fracture or dislocation of the right wrist, right elbow or right shoulder.      XR Shoulder Right G/E 3 Views   Final Result   IMPRESSION: Normal joint spaces and alignment. No acute fracture or dislocation of the right wrist, right elbow or right shoulder.      Ribs XR, unilat 3 views + PA chest, right   Final Result   IMPRESSION: Both lungs are inflated and clear. No adenopathy or effusion. Normal cardiac size and pulmonary vascularity. Metallic marker along the right chest wall inferiorly, at the site of reported pain. No fracture. No significant change.      XR Wrist Right G/E 3 Views   Final Result   IMPRESSION: Normal " joint spaces and alignment. No acute fracture or dislocation of the right wrist, right elbow or right shoulder.            ECG:  N/A      PROCEDURES:  N/a      Katia Khan PA-C   Emergency Medicine   [facility] EMERGENCY ROOM       Katia Khan PA-C  06/13/25 0007

## 2025-06-29 ENCOUNTER — HEALTH MAINTENANCE LETTER (OUTPATIENT)
Age: 35
End: 2025-06-29